# Patient Record
Sex: MALE | Race: WHITE | NOT HISPANIC OR LATINO | URBAN - METROPOLITAN AREA
[De-identification: names, ages, dates, MRNs, and addresses within clinical notes are randomized per-mention and may not be internally consistent; named-entity substitution may affect disease eponyms.]

---

## 2025-04-30 ENCOUNTER — APPOINTMENT (OUTPATIENT)
Dept: PRIMARY CARE | Facility: CLINIC | Age: 45
End: 2025-04-30
Payer: COMMERCIAL

## 2025-04-30 VITALS
HEIGHT: 69 IN | WEIGHT: 220 LBS | DIASTOLIC BLOOD PRESSURE: 86 MMHG | HEART RATE: 94 BPM | BODY MASS INDEX: 32.58 KG/M2 | SYSTOLIC BLOOD PRESSURE: 136 MMHG

## 2025-04-30 DIAGNOSIS — D64.9 ANEMIA, UNSPECIFIED TYPE: ICD-10-CM

## 2025-04-30 DIAGNOSIS — R73.02 IGT (IMPAIRED GLUCOSE TOLERANCE): ICD-10-CM

## 2025-04-30 DIAGNOSIS — E03.9 HYPOTHYROIDISM, UNSPECIFIED TYPE: ICD-10-CM

## 2025-04-30 DIAGNOSIS — Z00.00 ROUTINE GENERAL MEDICAL EXAMINATION AT A HEALTH CARE FACILITY: ICD-10-CM

## 2025-04-30 DIAGNOSIS — E78.5 DYSLIPIDEMIA: ICD-10-CM

## 2025-04-30 DIAGNOSIS — Z00.00 WELLNESS EXAMINATION: Primary | ICD-10-CM

## 2025-04-30 PROCEDURE — 99396 PREV VISIT EST AGE 40-64: CPT | Performed by: EMERGENCY MEDICINE

## 2025-04-30 PROCEDURE — 3008F BODY MASS INDEX DOCD: CPT | Performed by: EMERGENCY MEDICINE

## 2025-04-30 PROCEDURE — 96127 BRIEF EMOTIONAL/BEHAV ASSMT: CPT | Performed by: EMERGENCY MEDICINE

## 2025-04-30 NOTE — PROGRESS NOTES
"Subjective   Patient ID: Shahzad Cooper is a 44 y.o. male who presents for Annual Exam (Physical exam; constipation; feeling better now).    Assessment/Plan   Problem List Items Addressed This Visit    None  Visit Diagnoses         Anemia, unspecified type    -  Primary    Relevant Orders    CBC and Auto Differential      Routine general medical examination at a health care facility        Relevant Orders    Comprehensive Metabolic Panel      IGT (impaired glucose tolerance)        Relevant Orders    Hemoglobin A1C      Dyslipidemia        Relevant Orders    Lipid Panel      Hypothyroidism, unspecified type        Relevant Orders    TSH with reflex to Free T4 if abnormal          44-year-old for physical    No acute health issues at this time    Not on any prescription medicines    No family history of colon cancer or prostate cancer  Is up to date with tetanus shot    Routine lab work      PH Q-9 depression screening was completed by authorized employee of the practice for 5-10 minutes and  explained the questionnaire and discussed the answers with the patient.    Alcohol screening was completed for 5 to 10 minutes    Follow-up as needed  Source of history: Nurse, Medical personnel, Medical record, Patient.  History limitation: None.    HPI  44-year-old here for a physical  Was seen here once about 3 years ago    Currently does not have any acute health issues    Past history is positive only for s/p cholecystectomy.  No issues from that  Used to be on ADD medicines before but quit and has not had no issues since then    Social history-quit smoking about 5 years ago.  Drinks about couple of times a week    Family history-father and mother hypertension and epilepsy    Allergies[1]    Current Medications[2]    Objective   Visit Vitals  /86   Pulse 94   Ht 1.753 m (5' 9\")   Wt 99.8 kg (220 lb)   BMI 32.49 kg/m²   BSA 2.2 m²     Physical Exam  Vital signs as per nursing/MA documentation  General appearance: Alert and " in no acute distress  HEENT: Normal Inspection  Neck - Normal Inspection  Respiratory : No respiratory distress. Lungs are clear   Cardiovascular: heart rate normal. No gallop  Back - normal inspection  Skin inspection:Warm  Musculoskeletal : No deformities  Neuro : Limited exam. Baseline    Review of Systems   Comprehensive review of systems as allowed by patient condition and nursing input is negative    Results including lab work, imaging reports were reviewed and wherever possible, independently verified    Family History[3]  Social History[4]  Medical History[5]  Surgical History[6]    Charting was completed using voice recognition technology and may include unintended errors.         [1] No Known Allergies  [2]   No current outpatient medications on file.     No current facility-administered medications for this visit.   [3] No family history on file.  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

## 2025-05-01 LAB
ALBUMIN SERPL-MCNC: 5.1 G/DL (ref 3.6–5.1)
ALP SERPL-CCNC: 62 U/L (ref 36–130)
ALT SERPL-CCNC: 20 U/L (ref 9–46)
ANION GAP SERPL CALCULATED.4IONS-SCNC: 9 MMOL/L (CALC) (ref 7–17)
AST SERPL-CCNC: 30 U/L (ref 10–40)
BASOPHILS # BLD AUTO: 63 CELLS/UL (ref 0–200)
BASOPHILS NFR BLD AUTO: 1 %
BILIRUB SERPL-MCNC: 0.5 MG/DL (ref 0.2–1.2)
BUN SERPL-MCNC: 10 MG/DL (ref 7–25)
CALCIUM SERPL-MCNC: 9.6 MG/DL (ref 8.6–10.3)
CHLORIDE SERPL-SCNC: 104 MMOL/L (ref 98–110)
CHOLEST SERPL-MCNC: 207 MG/DL
CHOLEST/HDLC SERPL: 3.6 (CALC)
CO2 SERPL-SCNC: 27 MMOL/L (ref 20–32)
CREAT SERPL-MCNC: 0.89 MG/DL (ref 0.6–1.29)
EGFRCR SERPLBLD CKD-EPI 2021: 108 ML/MIN/1.73M2
EOSINOPHIL # BLD AUTO: 164 CELLS/UL (ref 15–500)
EOSINOPHIL NFR BLD AUTO: 2.6 %
ERYTHROCYTE [DISTWIDTH] IN BLOOD BY AUTOMATED COUNT: 12.5 % (ref 11–15)
EST. AVERAGE GLUCOSE BLD GHB EST-MCNC: 111 MG/DL
EST. AVERAGE GLUCOSE BLD GHB EST-SCNC: 6.2 MMOL/L
GLUCOSE SERPL-MCNC: 83 MG/DL (ref 65–99)
HBA1C MFR BLD: 5.5 %
HCT VFR BLD AUTO: 47.4 % (ref 38.5–50)
HDLC SERPL-MCNC: 57 MG/DL
HGB BLD-MCNC: 16.3 G/DL (ref 13.2–17.1)
LDLC SERPL CALC-MCNC: 129 MG/DL (CALC)
LYMPHOCYTES # BLD AUTO: 2085 CELLS/UL (ref 850–3900)
LYMPHOCYTES NFR BLD AUTO: 33.1 %
MCH RBC QN AUTO: 31.5 PG (ref 27–33)
MCHC RBC AUTO-ENTMCNC: 34.4 G/DL (ref 32–36)
MCV RBC AUTO: 91.7 FL (ref 80–100)
MONOCYTES # BLD AUTO: 636 CELLS/UL (ref 200–950)
MONOCYTES NFR BLD AUTO: 10.1 %
NEUTROPHILS # BLD AUTO: 3352 CELLS/UL (ref 1500–7800)
NEUTROPHILS NFR BLD AUTO: 53.2 %
NONHDLC SERPL-MCNC: 150 MG/DL (CALC)
PLATELET # BLD AUTO: 237 THOUSAND/UL (ref 140–400)
PMV BLD REES-ECKER: 10.8 FL (ref 7.5–12.5)
POTASSIUM SERPL-SCNC: 4.8 MMOL/L (ref 3.5–5.3)
PROT SERPL-MCNC: 8 G/DL (ref 6.1–8.1)
RBC # BLD AUTO: 5.17 MILLION/UL (ref 4.2–5.8)
SODIUM SERPL-SCNC: 140 MMOL/L (ref 135–146)
TRIGL SERPL-MCNC: 104 MG/DL
TSH SERPL-ACNC: 2.07 MIU/L (ref 0.4–4.5)
WBC # BLD AUTO: 6.3 THOUSAND/UL (ref 3.8–10.8)